# Patient Record
Sex: FEMALE | Race: WHITE | Employment: FULL TIME | URBAN - METROPOLITAN AREA
[De-identification: names, ages, dates, MRNs, and addresses within clinical notes are randomized per-mention and may not be internally consistent; named-entity substitution may affect disease eponyms.]

---

## 2021-07-03 ENCOUNTER — APPOINTMENT (OUTPATIENT)
Dept: GENERAL RADIOLOGY | Age: 56
End: 2021-07-03
Payer: COMMERCIAL

## 2021-07-03 ENCOUNTER — HOSPITAL ENCOUNTER (EMERGENCY)
Age: 56
Discharge: HOME | End: 2021-07-03
Attending: EMERGENCY MEDICINE
Payer: COMMERCIAL

## 2021-07-03 VITALS
HEART RATE: 88 BPM | RESPIRATION RATE: 16 BRPM | TEMPERATURE: 98.2 F | OXYGEN SATURATION: 97 % | BODY MASS INDEX: 20.4 KG/M2 | WEIGHT: 130 LBS | SYSTOLIC BLOOD PRESSURE: 110 MMHG | DIASTOLIC BLOOD PRESSURE: 77 MMHG | HEIGHT: 67 IN

## 2021-07-03 DIAGNOSIS — K59.00 CONSTIPATION, UNSPECIFIED CONSTIPATION TYPE: Primary | ICD-10-CM

## 2021-07-03 LAB
A/G RATIO: 1.4 (ref 1.1–2.2)
ALBUMIN SERPL-MCNC: 4.5 G/DL (ref 3.4–5)
ALP BLD-CCNC: 46 U/L (ref 40–129)
ALT SERPL-CCNC: 17 U/L (ref 10–40)
ANION GAP SERPL CALCULATED.3IONS-SCNC: 7 MMOL/L (ref 3–16)
AST SERPL-CCNC: 22 U/L (ref 15–37)
BASOPHILS ABSOLUTE: 0 K/UL (ref 0–0.2)
BASOPHILS RELATIVE PERCENT: 0.2 %
BILIRUB SERPL-MCNC: 0.8 MG/DL (ref 0–1)
BUN BLDV-MCNC: 10 MG/DL (ref 7–20)
CALCIUM SERPL-MCNC: 9 MG/DL (ref 8.3–10.6)
CHLORIDE BLD-SCNC: 103 MMOL/L (ref 99–110)
CO2: 30 MMOL/L (ref 21–32)
CREAT SERPL-MCNC: 0.7 MG/DL (ref 0.6–1.1)
EOSINOPHILS ABSOLUTE: 0 K/UL (ref 0–0.6)
EOSINOPHILS RELATIVE PERCENT: 0.3 %
GFR AFRICAN AMERICAN: >60
GFR NON-AFRICAN AMERICAN: >60
GLOBULIN: 3.2 G/DL
GLUCOSE BLD-MCNC: 100 MG/DL (ref 70–99)
HCG QUALITATIVE: NEGATIVE
HCT VFR BLD CALC: 36.3 % (ref 36–48)
HEMOGLOBIN: 12.4 G/DL (ref 12–16)
LYMPHOCYTES ABSOLUTE: 0.9 K/UL (ref 1–5.1)
LYMPHOCYTES RELATIVE PERCENT: 7.1 %
MCH RBC QN AUTO: 31.2 PG (ref 26–34)
MCHC RBC AUTO-ENTMCNC: 34.1 G/DL (ref 31–36)
MCV RBC AUTO: 91.6 FL (ref 80–100)
MONOCYTES ABSOLUTE: 0.9 K/UL (ref 0–1.3)
MONOCYTES RELATIVE PERCENT: 6.7 %
NEUTROPHILS ABSOLUTE: 11.1 K/UL (ref 1.7–7.7)
NEUTROPHILS RELATIVE PERCENT: 85.7 %
PDW BLD-RTO: 12.6 % (ref 12.4–15.4)
PLATELET # BLD: 207 K/UL (ref 135–450)
PMV BLD AUTO: 7.1 FL (ref 5–10.5)
POTASSIUM REFLEX MAGNESIUM: 4 MMOL/L (ref 3.5–5.1)
RBC # BLD: 3.97 M/UL (ref 4–5.2)
SODIUM BLD-SCNC: 140 MMOL/L (ref 136–145)
TOTAL PROTEIN: 7.7 G/DL (ref 6.4–8.2)
WBC # BLD: 12.9 K/UL (ref 4–11)

## 2021-07-03 PROCEDURE — 84703 CHORIONIC GONADOTROPIN ASSAY: CPT

## 2021-07-03 PROCEDURE — 80053 COMPREHEN METABOLIC PANEL: CPT

## 2021-07-03 PROCEDURE — 99284 EMERGENCY DEPT VISIT MOD MDM: CPT

## 2021-07-03 PROCEDURE — 74022 RADEX COMPL AQT ABD SERIES: CPT

## 2021-07-03 PROCEDURE — 6370000000 HC RX 637 (ALT 250 FOR IP): Performed by: NURSE PRACTITIONER

## 2021-07-03 PROCEDURE — 99283 EMERGENCY DEPT VISIT LOW MDM: CPT

## 2021-07-03 PROCEDURE — 85025 COMPLETE CBC W/AUTO DIFF WBC: CPT

## 2021-07-03 RX ORDER — RISEDRONATE SODIUM 150 MG/1
TABLET, FILM COATED ORAL
COMMUNITY
Start: 2021-05-03

## 2021-07-03 RX ADMIN — MINERAL OIL 330 ML: 1000 LIQUID ORAL at 16:14

## 2021-07-03 ASSESSMENT — PAIN SCALES - GENERAL: PAINLEVEL_OUTOF10: 2

## 2021-07-03 ASSESSMENT — PAIN DESCRIPTION - PAIN TYPE: TYPE: ACUTE PAIN

## 2021-07-03 ASSESSMENT — PAIN DESCRIPTION - LOCATION: LOCATION: ABDOMEN

## 2021-07-03 NOTE — ED NOTES
Results after first instillation of fluid resulted in a large semi-formed BM. Reporting still felling like she has stool.  The remaining 150 ML instilled tolerating with no difficulty      Nurys Haro RN  07/03/21 7197

## 2021-07-03 NOTE — ED NOTES
Second instillation of fluid resulted in liquid brown stool. Reporting the sensation of relief after.       Prakash Carson RN  07/03/21 2956

## 2021-07-03 NOTE — ED PROVIDER NOTES
I independently examined and evaluated Brandy Chang. All diagnostic, treatment, and disposition decisions were made by myself in conjunction with the [facility], [place]. For all further details of the patient's emergency department visit, please see their documentation. 49 yr old female presents with constipation. She reports abd cramping but no vomiting or fever. She has h/o constipation and irregular BM's. Vitals:    07/03/21 1750   BP: 110/77   Pulse: 88   Resp: 16   Temp:    SpO2: 97%     On exam I am seeing patient after a large BM s/p enema. She is in NAD, Heart regular rate, abd nondistended.       Results for orders placed or performed during the hospital encounter of 07/03/21   CBC Auto Differential   Result Value Ref Range    WBC 12.9 (H) 4.0 - 11.0 K/uL    RBC 3.97 (L) 4.00 - 5.20 M/uL    Hemoglobin 12.4 12.0 - 16.0 g/dL    Hematocrit 36.3 36.0 - 48.0 %    MCV 91.6 80.0 - 100.0 fL    MCH 31.2 26.0 - 34.0 pg    MCHC 34.1 31.0 - 36.0 g/dL    RDW 12.6 12.4 - 15.4 %    Platelets 833 092 - 817 K/uL    MPV 7.1 5.0 - 10.5 fL    Neutrophils % 85.7 %    Lymphocytes % 7.1 %    Monocytes % 6.7 %    Eosinophils % 0.3 %    Basophils % 0.2 %    Neutrophils Absolute 11.1 (H) 1.7 - 7.7 K/uL    Lymphocytes Absolute 0.9 (L) 1.0 - 5.1 K/uL    Monocytes Absolute 0.9 0.0 - 1.3 K/uL    Eosinophils Absolute 0.0 0.0 - 0.6 K/uL    Basophils Absolute 0.0 0.0 - 0.2 K/uL   Comprehensive Metabolic Panel w/ Reflex to MG   Result Value Ref Range    Sodium 140 136 - 145 mmol/L    Potassium reflex Magnesium 4.0 3.5 - 5.1 mmol/L    Chloride 103 99 - 110 mmol/L    CO2 30 21 - 32 mmol/L    Anion Gap 7 3 - 16    Glucose 100 (H) 70 - 99 mg/dL    BUN 10 7 - 20 mg/dL    CREATININE 0.7 0.6 - 1.1 mg/dL    GFR Non-African American >60 >60    GFR African American >60 >60    Calcium 9.0 8.3 - 10.6 mg/dL    Total Protein 7.7 6.4 - 8.2 g/dL    Albumin 4.5 3.4 - 5.0 g/dL    Albumin/Globulin Ratio 1.4 1.1 - 2.2    Total Bilirubin 0.8 0.0 - 1.0 mg/dL    Alkaline Phosphatase 46 40 - 129 U/L    ALT 17 10 - 40 U/L    AST 22 15 - 37 U/L    Globulin 3.2 g/dL   HCG Qualitative, Serum   Result Value Ref Range    hCG Qual Negative Detects HCG level >10 MIU/mL         XR ACUTE ABD SERIES CHEST 1 VW   Final Result   Large amount stool in the left-sided colon consistent with constipation. No   evidence of obstruction or other acute process. Patient is s/p enema here and had large BM. Feels much better and will be DC'd home.      Priyank Mandujano MD  07/06/21 9711

## 2021-07-03 NOTE — ED NOTES
D/c home with instructions, ambulated out with a steady gait. Verbalizes understanding of increased fluid intake, high fiber diet and follow-up plan.       Seda Eid RN  07/03/21 0243

## 2021-07-03 NOTE — ED NOTES
SMOG enema instilled. Able to tolerate aprrox 150 ml prior to feeling increased urge to defecate.  Laying on left side encouraged to hold as long as she was able to tolerate for maximum results/      Justino Ordaz RN  07/03/21 7613

## 2021-07-03 NOTE — ED PROVIDER NOTES
Evaluated by Advanced Practice Provider    [address] [facility]  ED    CHIEF COMPLAINT  Constipation (pt hasnt had a bm since thursday last week, pts only change in diet was a cranberry and vodka drink the day before, pt is now having belly pain, pt is passing gas, pt to liquid colace 60mg this morning with no sucess )    HISTORY OF PRESENT ILLNESS  eJet Bonner is a 49 y.o. female who presents to the ED complaining of constipation abdominal pain. Stomach is bloated and feeling full, there is gurgling, she is passing gas. She has not had a BM since last Thursday. She typically is not regular with her BMs, will go 3-4 days between BMs. She did have some cranberry juice and vodka before her last BM and it \"cleaned her out\". Has felt nauseous but no vomiting. No prior abdominal surgeries except for a left inguinal hernia repair when she was 7. No fevers, chills, sweats. States that she does not take any medications at home except for a pill for osteoporosis once a month and does not take over-the-counter medications. Patient is not from this area and is here visiting family. The patient is currently rating their pain as 2/10 and describes it as an cramping type of pain. Treatments tried prior to arrival in the ED: Above. The patient arrived to the ED via private car. PAST MEDICAL HISTORY    History reviewed. No pertinent past medical history. SURGICAL HISTORY    History reviewed. No pertinent surgical history. CURRENT MEDICATIONS    Current Outpatient Rx   Medication Sig Dispense Refill    Risedronate Sodium 150 MG TABS TAKE 1 TABLET BY MOUTH EVERY 30 DAYS         ALLERGIES    No Known Allergies    FAMILY HISTORY    History reviewed. No pertinent family history.     SOCIAL HISTORY    Social History     Socioeconomic History    Marital status:      Spouse name: None    Number of children: None    Years of education: None    Highest education level: None   Occupational History  None   Tobacco Use    Smoking status: Never Smoker    Smokeless tobacco: Never Used   Substance and Sexual Activity    Alcohol use: Yes     Comment: social    Drug use: Never    Sexual activity: Not Currently   Other Topics Concern    None   Social History Narrative    None     Social Determinants of Health     Financial Resource Strain:     Difficulty of Paying Living Expenses:    Food Insecurity:     Worried About Running Out of Food in the Last Year:     Ran Out of Food in the Last Year:    Transportation Needs:     Lack of Transportation (Medical):  Lack of Transportation (Non-Medical):    Physical Activity:     Days of Exercise per Week:     Minutes of Exercise per Session:    Stress:     Feeling of Stress :    Social Connections:     Frequency of Communication with Friends and Family:     Frequency of Social Gatherings with Friends and Family:     Attends Holiness Services:     Active Member of Clubs or Organizations:     Attends Club or Organization Meetings:     Marital Status:    Intimate Partner Violence:     Fear of Current or Ex-Partner:     Emotionally Abused:     Physically Abused:     Sexually Abused:        REVIEW OFSYSTEMS    10systems reviewed, pertinent positives per HPI otherwise noted to be negative. PHYSICAL EXAM  Physical Exam  Vitals:    07/03/21 1335   BP:    Pulse: 101   Resp:    Temp:    SpO2: 100%       GENERAL: Patient is well-developed, thin. Awake andalert. Cooperative. Resting in bed. No apparent distress. HEENT:  Normocephalic, atraumatic. Conjunctivaappear normal. Sclera is non-icteric. External ears are normal.    NECK: Supple with normal ROM. Tracheamidline  LUNGS: Equal and symmetric chest rise. Breathing is unlabored. Speaking comfortably in fullsentences. Lungs are clear bilaterally to auscultation. Without wheezing, rales, or rhonchi. CADIOVASCULAR: Tachycardic rate and rhythm. Normal S1-S2 sounds. No murmurs, rubs, or gallops.   GI: Soft, nontender to palpation, nondistended with positive bowel sounds. No rebound tenderness, guarding or rigidity. Negative Irving's sign. Negative Rovsing's sign. No CVAT to palpation. No masses or hepatosplenomegaly to palpation. MUSCULOSKELETAL:  No gross deformities or trauma noted. Moving all extremities equally and appropriately. Normal ROM. SKIN: Warm/dry. Skin is intact. No rashes or lesions noted. PSYCHIATRIC: Mood and affect appropriate. Speech is clear and articulate. NEUROLOGIC: Alert and oriented. No focal motor or sensory deficits. LABS   No results found for this visit on 07/03/21. RADIOLOGY    XR ACUTE ABD SERIES CHEST 1 VW    Result Date: 7/3/2021  EXAMINATION: TWO XRAY VIEWS OF THE ABDOMEN AND SINGLE  XRAY VIEW OF THE CHEST 7/3/2021 2:55 pm COMPARISON: None. HISTORY: ORDERING SYSTEM PROVIDED HISTORY: constipation TECHNOLOGIST PROVIDED HISTORY: Reason for exam:->constipation Reason for Exam: HASN'T HAD A BM IN 9 DAYS FINDINGS: No evidence of pneumonia or other acute process of the chest.  Bowel gas pattern is within normal limits. No evidence of obstruction. No evidence of ileus. No radiographic evidence of bowel wall thickening or free air. A large amount stool is present in the left-sided colon. Large amount stool in the left-sided colon consistent with constipation. No evidence of obstruction or other acute process. ED COURSE/MDM  Patient seen and evaluated. Old records reviewed. Diagnostic testing reviewed and results discussed. I have seen and evaluated this patient with supervising physician: Guy Gaucher, MD. We thoroughly discussed the history, physical exam, diagnostic testing, emergency department course, plan and disposition. Godfrey Galindo presented to the ED today with above noted complaints. Physical exam does not reveal abdominal tenderness to palpation. X-ray here in the ER showed large amount of stool in the left colon.   I discussed with the patient going home with medications to help her have a bowel movement versus getting an enema here in the ER. Patient is not from this area and states she would prefer to have this done here instead of doing it at home since she is staying with relatives. Blood work shows a slight leukocytosis is WBC elevated at 12.9, absolute neutrophils elevated at 11.1, absolute lymphocytes low at 0.9. No anemia. No electrolyte abnormality. No evidence of acute kidney injury or transaminitis. Pregnancy test is negative. Pt was given the following medications or treatments in the ED:   Medications   SMOG Enema (330 mLs Rectal Given 7/3/21 4791)     Patient was signed out to ED attending Dr. Thierno Ziegler in stable condition at 4 pm.  She was pending results of the smog enema, plan was to DC when she felt relief. I have evaluated the patient and considered the following diagnoses: ACUTE APPENDICITIS, BOWEL OBSTRUCTION, CHOLECYSTITIS, DIVERTICULITIS, INCARCERATED HERNIA, PANCREATITIS, PERFORATED BOWEL, ULCER. CLINICAL IMPRESSION    1. Constipation, unspecified constipation type       DISPOSITION  Patient was discharged to home in good condition. Comment: Pleasenote this report has been produced using speech recognition software and may contain errors related to that system including errors in grammar, punctuation, and spelling, as well as words and phrases that may beinappropriate. If there are any questions or concerns please feel free to contact the dictating provider for clarification.         JODI Dickinson - RANJIT  07/04/21 6613

## 2021-07-03 NOTE — ED NOTES
Report received care assumed. Patient educated on how the enema process would go. Bedside commode to bedside.       Shivani Stovall RN  07/03/21 8382